# Patient Record
Sex: MALE | Race: WHITE | NOT HISPANIC OR LATINO | Employment: UNEMPLOYED | URBAN - METROPOLITAN AREA
[De-identification: names, ages, dates, MRNs, and addresses within clinical notes are randomized per-mention and may not be internally consistent; named-entity substitution may affect disease eponyms.]

---

## 2017-11-10 ENCOUNTER — HOSPITAL ENCOUNTER (EMERGENCY)
Facility: OTHER | Age: 27
Discharge: HOME | End: 2017-11-10
Attending: EMERGENCY MEDICINE

## 2017-11-10 VITALS
RESPIRATION RATE: 16 BRPM | DIASTOLIC BLOOD PRESSURE: 62 MMHG | HEART RATE: 91 BPM | TEMPERATURE: 98 F | SYSTOLIC BLOOD PRESSURE: 128 MMHG | OXYGEN SATURATION: 100 % | WEIGHT: 150 LBS

## 2017-11-10 DIAGNOSIS — L08.9 SKIN INFECTION: ICD-10-CM

## 2017-11-10 DIAGNOSIS — M25.531 ACUTE PAIN OF RIGHT WRIST: Primary | ICD-10-CM

## 2017-11-10 DIAGNOSIS — R52 PAIN: ICD-10-CM

## 2017-11-10 DIAGNOSIS — F19.10 IV DRUG ABUSE: ICD-10-CM

## 2017-11-10 PROCEDURE — 25000003 PHARM REV CODE 250: Performed by: PHYSICIAN ASSISTANT

## 2017-11-10 PROCEDURE — 99284 EMERGENCY DEPT VISIT MOD MDM: CPT

## 2017-11-10 RX ORDER — CEPHALEXIN 500 MG/1
500 CAPSULE ORAL 4 TIMES DAILY
Qty: 20 CAPSULE | Refills: 0 | Status: SHIPPED | OUTPATIENT
Start: 2017-11-10 | End: 2017-11-15

## 2017-11-10 RX ORDER — IBUPROFEN 800 MG/1
800 TABLET ORAL 3 TIMES DAILY
Qty: 20 TABLET | Refills: 0 | Status: SHIPPED | OUTPATIENT
Start: 2017-11-10

## 2017-11-10 RX ORDER — SULFAMETHOXAZOLE AND TRIMETHOPRIM 800; 160 MG/1; MG/1
1 TABLET ORAL 2 TIMES DAILY
Qty: 14 TABLET | Refills: 0 | Status: SHIPPED | OUTPATIENT
Start: 2017-11-10 | End: 2017-11-17

## 2017-11-10 RX ORDER — IBUPROFEN 400 MG/1
800 TABLET ORAL
Status: COMPLETED | OUTPATIENT
Start: 2017-11-10 | End: 2017-11-10

## 2017-11-10 RX ORDER — ACETAMINOPHEN 500 MG
1000 TABLET ORAL
Status: COMPLETED | OUTPATIENT
Start: 2017-11-10 | End: 2017-11-10

## 2017-11-10 RX ADMIN — IBUPROFEN 800 MG: 400 TABLET, FILM COATED ORAL at 04:11

## 2017-11-10 RX ADMIN — ACETAMINOPHEN 1000 MG: 500 TABLET, COATED ORAL at 04:11

## 2017-11-10 NOTE — ED PROVIDER NOTES
Encounter Date: 11/10/2017       History     Chief Complaint   Patient presents with    staph infection     pt states he  has staph infection and pain at site.      Patient is a 27 y.o. male with a past medical history of IVDA, PTSD, presenting to the emergency department with complaints of right wrist pain. The patient reports that yesterday evening around dark, he started developing pain to the right wrist. The patient states that since the morning, he has been unable to move his right wrist or hand due to pain. He states his pain is a 9/10. He denies numbness, tingling, or weakness. He denies recent injury or trauma. He denies previous injury. He does admit that he injects heroin into his right arm, last use was yesterday. He also reports that he used meth yesterday. In addition, he states that he has multiple skin sores to the right upper extremity and shoulder. He denies skin popping. He denies other associated symptoms. He reports he is homeless, recently arrived to [place] from [place] four days ago.       The history is provided by the patient.     Review of patient's allergies indicates:  No Known Allergies  Past Medical History:   Diagnosis Date    ADHD     Anxiety     Depression     PTSD (post-traumatic stress disorder)     Seizures      History reviewed. No pertinent surgical history.  History reviewed. No pertinent family history.  Social History   Substance Use Topics    Smoking status: Current Every Day Smoker     Packs/day: 2.00     Types: Cigarettes    Smokeless tobacco: Never Used    Alcohol use Yes      Comment: daily     Review of Systems   Constitutional: Negative for activity change, chills, fatigue and fever.   HENT: Negative for congestion, rhinorrhea and sore throat.    Eyes: Negative for photophobia and visual disturbance.   Respiratory: Negative for cough and shortness of breath.    Cardiovascular: Negative for chest pain.   Gastrointestinal: Negative for abdominal pain,  diarrhea, nausea and vomiting.   Genitourinary: Negative for dysuria, hematuria and urgency.   Musculoskeletal: Positive for arthralgias and myalgias. Negative for back pain and neck pain.   Skin: Positive for wound. Negative for color change.   Neurological: Negative for weakness and headaches.   Psychiatric/Behavioral: Negative for agitation and confusion.       Physical Exam     Initial Vitals [11/10/17 1523]   BP Pulse Resp Temp SpO2   130/60 94 18 97.9 °F (36.6 °C) 100 %      MAP       83.33         Physical Exam    Nursing note and vitals reviewed.  Constitutional: Vital signs are normal. He appears well-developed and well-nourished. He is not diaphoretic.  Non-toxic appearance. He does not have a sickly appearance. He does not appear ill. No distress.   Malodorous, disheveled,  male accompanied by his dog in the ED. He is presenting via EMS. He is in no acute distress. He is speaking in clear and full sentences.    HENT:   Head: Normocephalic and atraumatic.   Right Ear: External ear normal.   Left Ear: External ear normal.   Nose: Nose normal.   Mouth/Throat: Oropharynx is clear and moist.   Eyes: Conjunctivae and EOM are normal.   Neck: Normal range of motion. Neck supple.   Cardiovascular: Normal rate, regular rhythm and normal heart sounds.   Pulmonary/Chest: Breath sounds normal. No respiratory distress. He has no wheezes.   Musculoskeletal: Normal range of motion.   Tenderness to palpation of the right upper extremity along the bilateral aspects of the wrist and base of the hand with no palpable deformity, crepitus, step-off.  No edema, erythema, overlying skin changes.  Normal capillary refill, strength, sensation.  Track marks noted to the antecubital fossa.  Multiple, erythematous lesions noted to the right upper extremity and posterior shoulder with no active drainage.  No fluctuance noted.  No overlying cellulitis.   Neurological: He is alert and oriented to person, place, and time. GCS  eye subscore is 4. GCS verbal subscore is 5. GCS motor subscore is 6.   Skin: Skin is warm.   Psychiatric: He has a normal mood and affect. His behavior is normal. Judgment and thought content normal.         ED Course   Procedures  Labs Reviewed - No data to display     Imaging Results          X-Ray Wrist Complete Right (Final result)  Result time 11/10/17 16:21:31    Final result by Bhavesh Booth MD (11/10/17 16:21:31)                 Impression:     Unremarkable examination.      Electronically signed by: BHAVESH BOOTH MD  Date:     11/10/17  Time:    16:21              Narrative:    PA, lateral, oblique views of right wrist, 3 views    Comparison: Not available.    Findings: There is no fracture or dislocation.  Cartilage spaces are maintained.  Soft tissues are unremarkable.                             X-Rays:   Independently Interpreted Readings:   Other Readings:  X-ray right wrist - no acute fracture, dislocation, or soft tissue edema    Medical Decision Making:   Initial Assessment:   Urgent evaluation of a 27 y.o. male with a past medical history of IVDA, PTSD, anxiety, presenting to the emergency department complaining of right wrist pain and multiple skin sores. Patient is afebrile, nontoxic appearing and hemodynamically stable and neurovascularly intact. Physical exam reveals regular rate and rhythm, lungs are clear to auscultation bilaterally.  Decreased range of motion with tenderness to palpation diffusely of the right wrist with no bony deformity, edema, erythema or findings on exam.  Will plan for x-ray, analgesics and reassess.  Independently Interpreted Test(s):   I have ordered and independently interpreted X-rays - see prior notes.  Clinical Tests:   Radiological Study: Ordered and Reviewed  ED Management:  X-ray shows no acute findings.  At this time, do not feel that further testing or imaging is warranted.  Although I did consider septic arthritis, patient does not have any edema,  erythema, fever, chills or other concerning findings.  Given his multiple skin lesions, did elect to start him on antibiotics.  He was given prescriptions for Bactrim, Keflex, and ibuprofen.  Patient is counseled on symptomatic care and treatment.  He stable for discharge home. The patient was instructed to follow up with a primary care provider in 2 days or to return to the emergency department for worsening symptoms. The treatment plan was discussed with the patient who demonstrated understanding and comfort with plan. The patient's history, physical exam, and plan of care was discussed with and agreed upon with my supervising physician.    Other:   I have discussed this case with another health care provider.       <> Summary of the Discussion: Dr. Montero  This note was created using Dragon Medical Dictation. There may be typographical errors secondary to dictation.                    ED Course      Clinical Impression:     1. Acute pain of right wrist    2. Pain    3. Skin infection    4. IV drug abuse       Disposition:   Disposition: Discharged  Condition: Stable                        Mona Griffin PA-C  11/10/17 0933

## 2017-11-10 NOTE — ED NOTES
Patient Identifiers for Gustavo Martinez checked and correct  LOC: The patient is awake, alert and aware of environment with an appropriate affect, the patient is oriented x 3 and speaking appropriate.  APPEARANCE: Patient resting comfortably and in no acute distress. The patient is clean and well groomed. The patient's clothing is properly fastened.  SKIN: The skin is warm and dry. The patient has normal skin turgor and moist mucus membranes. Multiple sores and redness with scabs noted to right arm, back, face, and left butt cheek  Musculoskeletal :  Normal range of motion noted. Moves all extremities well, No swelling or tenderness noted  RESPIRATORY: Airway is open and patent, respirations are spontaneous, patient has a normal effort and rate.  PULSES: 2+ radial  pulses, symmetrical in all extremities.  NEUROLOGIC: PERRL,  Motor strength 5/5 all extremities.  The pt's facial expression is symmetrical, patient moving all extremities, normal sensation in all extremities when touched with a finger.The patient is awake, alert and cooperative with a calm affect, patient is aware of environment.      Will continue to monitor

## 2017-11-10 NOTE — ED TRIAGE NOTES
"Pt states his right arm began "throbbing"  Last night after dark. Pt states shot up heroin yesterday in right arm.  Pt also complaining of multiple sores on his right arm, back, left butt cheek and face.  Pt  States is homeless and slept in some grass or bushes last night.  States the sores on his body only began since he's been in [place] which was 4 days ago.  Pt denies fevers, vomiting, or diarrhea.  But states has had some nausea.    "